# Patient Record
Sex: FEMALE | Race: WHITE | NOT HISPANIC OR LATINO | URBAN - METROPOLITAN AREA
[De-identification: names, ages, dates, MRNs, and addresses within clinical notes are randomized per-mention and may not be internally consistent; named-entity substitution may affect disease eponyms.]

---

## 2018-04-11 NOTE — PATIENT DISCUSSION
The patient had a lesion on the left lower eyelid centrally. After informed consent the lesion was anesthetized with local anesthetic, 1% lidocane with epinephrine 1:100,000 units. Sterile technique was used to remove the lesion with Tan scissors. Antibiotic ointment was used to treat the area where lesion was removed. Lesion was sent to pathology for analysis. The patient was given written post operative wound care instructions and a prescription for antibiotic ointment. The patient was asked to call  within 10 days if they had not been otherwise called by our office with the result of the biopsy.

## 2018-04-11 NOTE — PATIENT DISCUSSION
The patient had a lesion on the left brow medially. After informed consent the lesion was anesthetized with local anesthetic, 1% lidocane with epinephrine 1:100,000 units. Sterile technique was used to remove the lesion with Tan scissors. Antibiotic ointment was used to treat the area where lesion was removed. Lesion was sent to pathology for analysis. The patient was given written post operative wound care instructions and a prescription for antibiotic ointment. The patient was asked to call  within 10 days if they had not been otherwise called by our office with the result of the biopsy.

## 2018-04-11 NOTE — PATIENT DISCUSSION
PHOTOGRAPHS: I have reviewed the external ocular photographs of this patient which show the following: lesions left lower eyelid and left brow medially.

## 2018-04-11 NOTE — PATIENT DISCUSSION
The patient had a lesion on the left lower eyelid. After informed consent the lesion was anesthetized with local anesthetic, 1% lidocane with epinephrine 1:100,000 units. Sterile technique was used to remove the lesion with Tan scissors. Antibiotic ointment was used to treat the area where lesion was removed. Lesion was sent to pathology for analysis. The patient was given written post operative wound care instructions and a prescription for antibiotic ointment. The patient was asked to call  within 10 days if they had not been otherwise called by our office with the result of the biopsy.

## 2021-07-14 NOTE — PATIENT DISCUSSION
After informed consent the patient received 0.2 cc of Kenalog 10mg/ml to the left upper eyelid for management of a chalazion which was not amenable to incision and drainage.

## 2021-07-14 NOTE — PATIENT DISCUSSION
CHALAZION JASMIN: RECOMMEND WARM COMPRESSES, EYELID SCRUBS AND KENALOG INJECTION. FOLLOW UP IF SYMPTOMS PERSIST.

## 2022-10-05 ENCOUNTER — ESTABLISHED PATIENT (OUTPATIENT)
Dept: URBAN - METROPOLITAN AREA CLINIC 20 | Facility: CLINIC | Age: 52
End: 2022-10-05

## 2022-10-05 DIAGNOSIS — H52.4: ICD-10-CM

## 2022-10-05 PROCEDURE — 92015 DETERMINE REFRACTIVE STATE: CPT

## 2022-10-05 PROCEDURE — 92014 COMPRE OPH EXAM EST PT 1/>: CPT

## 2022-10-05 ASSESSMENT — TONOMETRY
OS_IOP_MMHG: 10
OD_IOP_MMHG: 11

## 2022-10-05 ASSESSMENT — KERATOMETRY
OD_K2POWER_DIOPTERS: 46.25
OS_AXISANGLE_DEGREES: 172
OD_AXISANGLE2_DEGREES: 73
OD_K1POWER_DIOPTERS: 44.75
OS_K2POWER_DIOPTERS: 46.50
OS_K1POWER_DIOPTERS: 45.00
OD_AXISANGLE_DEGREES: 163
OS_AXISANGLE2_DEGREES: 82

## 2022-10-05 ASSESSMENT — VISUAL ACUITY
OU_CC: J1+
OD_CC: 20/20
OS_CC: 20/20

## 2023-10-10 ENCOUNTER — ESTABLISHED PATIENT (OUTPATIENT)
Dept: URBAN - METROPOLITAN AREA CLINIC 20 | Facility: CLINIC | Age: 53
End: 2023-10-10

## 2023-10-10 DIAGNOSIS — H25.813: ICD-10-CM

## 2023-10-10 DIAGNOSIS — H52.4: ICD-10-CM

## 2023-10-10 DIAGNOSIS — H43.393: ICD-10-CM

## 2023-10-10 PROCEDURE — 92015 DETERMINE REFRACTIVE STATE: CPT

## 2023-10-10 PROCEDURE — 92310E CONTACT LENS 100

## 2023-10-10 PROCEDURE — 92014 COMPRE OPH EXAM EST PT 1/>: CPT

## 2023-10-10 ASSESSMENT — VISUAL ACUITY
OD_CC: 20/20-1
OS_CC: 20/20

## 2023-10-10 ASSESSMENT — KERATOMETRY
OS_AXISANGLE_DEGREES: 172
OD_K1POWER_DIOPTERS: 44.75
OD_K2POWER_DIOPTERS: 46.25
OS_K1POWER_DIOPTERS: 45.00
OD_AXISANGLE2_DEGREES: 73
OS_AXISANGLE2_DEGREES: 82
OD_AXISANGLE_DEGREES: 163
OS_K2POWER_DIOPTERS: 46.50

## 2023-10-10 ASSESSMENT — TONOMETRY
OD_IOP_MMHG: 12
OS_IOP_MMHG: 10